# Patient Record
Sex: FEMALE
[De-identification: names, ages, dates, MRNs, and addresses within clinical notes are randomized per-mention and may not be internally consistent; named-entity substitution may affect disease eponyms.]

---

## 2023-10-20 ENCOUNTER — RX ONLY (OUTPATIENT)
Age: 68
Setting detail: RX ONLY
End: 2023-10-20

## 2023-10-20 ENCOUNTER — APPOINTMENT (RX ONLY)
Dept: URBAN - METROPOLITAN AREA CLINIC 88 | Facility: CLINIC | Age: 68
Setting detail: DERMATOLOGY
End: 2023-10-20

## 2023-10-20 DIAGNOSIS — Z01.818 ENCOUNTER FOR OTHER PREPROCEDURAL EXAMINATION: ICD-10-CM

## 2023-10-20 PROCEDURE — ? ADDITIONAL NOTES

## 2023-10-20 RX ORDER — CEPHALEXIN 500 MG/1
CAPSULE ORAL
Qty: 21 | Refills: 2 | Status: ERX | COMMUNITY
Start: 2023-10-20

## 2023-10-20 RX ORDER — OXYCODONE HYDROCHLORIDE AND ACETAMINOPHEN 5; 325 MG/1; MG/1
TABLET ORAL
Qty: 28 | Refills: 0 | Status: ERX | COMMUNITY
Start: 2023-10-20

## 2023-10-20 RX ORDER — ONDANSETRON 4 MG/1
TABLET, ORALLY DISINTEGRATING ORAL
Qty: 15 | Refills: 0 | Status: ERX | COMMUNITY
Start: 2023-10-20

## 2023-10-20 RX ORDER — CYCLOBENZAPRINE HYDROCHLORIDE 5 MG/1
TABLET, FILM COATED ORAL
Qty: 30 | Refills: 0 | Status: ERX | COMMUNITY
Start: 2023-10-20

## 2023-10-20 RX ORDER — APREPITANT 40 MG/1
CAPSULE ORAL
Qty: 1 | Refills: 0 | Status: ERX | COMMUNITY
Start: 2023-10-20

## 2023-10-20 NOTE — PROCEDURE: ADDITIONAL NOTES
Render Risk Assessment In Note?: no
Additional Notes: SX Date: 11/28/23 SX - Explant. \\nPre-op in person 10/20/23\\n\\nHT: 5'2 / WT: 134 lbs / \\nAllergies:NKA\\nPMH: Fibromyalgia, arthritis. \\nHospitalizations: Denies. \\nMeds: Vit C, D3, enzymes, zinc, probiotics, k2, selenium, Mg, activated charcoal.\\nSX HX: breast augmentations, hysterectomy. \\nHx of Nausea after sx: No\\nSmoke/Vape/Hookah: Denies. \\nETOH: Socially. \\n\\nImplants: No.\\nPath: Surgeon's discretion. \\n\\nExparel: Yes. \\nMyers: Yes. \\n\\nBalance:\\n\\nHusband will be taking care of her. \\nMeds sent to pharmacy, Follow up visits scheduled. \\nLabwork CBC w Diff, CMP / EKG /PT/ PTT/ INR CXR / Assessment and clearance. Photos done in office. \\nRecommendations prior to surgery were given. \\nStop blood thinners 2 weeks prior. \\nNicotine test day of sx, Pt verbalized understanding and agreement.

## 2024-02-05 ENCOUNTER — RX ONLY (OUTPATIENT)
Age: 69
Setting detail: RX ONLY
End: 2024-02-05

## 2024-02-05 RX ORDER — ONDANSETRON 4 MG/1
TABLET, ORALLY DISINTEGRATING ORAL
Qty: 15 | Refills: 0 | Status: ERX

## 2024-02-05 RX ORDER — CEPHALEXIN 500 MG/1
CAPSULE ORAL
Qty: 21 | Refills: 2 | Status: ERX

## 2024-02-05 RX ORDER — CYCLOBENZAPRINE HYDROCHLORIDE 5 MG/1
TABLET, FILM COATED ORAL
Qty: 30 | Refills: 0 | Status: ERX

## 2024-02-05 RX ORDER — OXYCODONE HYDROCHLORIDE AND ACETAMINOPHEN 5; 325 MG/1; MG/1
TABLET ORAL
Qty: 28 | Refills: 0 | Status: ERX

## 2024-02-05 RX ORDER — APREPITANT 40 MG/1
CAPSULE ORAL
Qty: 1 | Refills: 0 | Status: ERX

## 2024-02-06 ENCOUNTER — APPOINTMENT (RX ONLY)
Dept: URBAN - METROPOLITAN AREA CLINIC 88 | Facility: CLINIC | Age: 69
Setting detail: DERMATOLOGY
End: 2024-02-06

## 2024-02-06 DIAGNOSIS — Z41.9 ENCOUNTER FOR PROCEDURE FOR PURPOSES OTHER THAN REMEDYING HEALTH STATE, UNSPECIFIED: ICD-10-CM

## 2024-02-06 PROCEDURE — ? OPERATIVE NOTE - BRIEF

## 2024-02-06 NOTE — PROCEDURE: OPERATIVE NOTE - BRIEF
Monitoring: Full continuous cardiac monitoring and automated blood-pressure measurements were performed per protocol.
Detail Level: Detailed
Additional Epidermal Closure: horizontal mattress
Estimated Blood Loss (Cc): minimal
Surgeon: Dr. Eusebio Lua DO FACS
Position: supine

## 2024-02-07 ENCOUNTER — APPOINTMENT (RX ONLY)
Dept: URBAN - METROPOLITAN AREA CLINIC 88 | Facility: CLINIC | Age: 69
Setting detail: DERMATOLOGY
End: 2024-02-07

## 2024-02-07 PROCEDURE — 99024 POSTOP FOLLOW-UP VISIT: CPT

## 2024-02-07 PROCEDURE — ? COUNSELING - POST-OP CHECK, BREAST IMPLANT REMOVAL

## 2024-02-07 NOTE — PROCEDURE: COUNSELING - POST-OP CHECK, BREAST IMPLANT REMOVAL
Detail Level: Simple
Add Postop Global No-Charge Code (62834)?: yes
Count Minor/Major Decisions Toward Mdm (Not Cosmetic)?: No

## 2024-02-13 ENCOUNTER — APPOINTMENT (RX ONLY)
Dept: URBAN - METROPOLITAN AREA CLINIC 88 | Facility: CLINIC | Age: 69
Setting detail: DERMATOLOGY
End: 2024-02-13

## 2024-02-23 ENCOUNTER — APPOINTMENT (RX ONLY)
Dept: URBAN - METROPOLITAN AREA CLINIC 88 | Facility: CLINIC | Age: 69
Setting detail: DERMATOLOGY
End: 2024-02-23

## 2024-02-28 ENCOUNTER — APPOINTMENT (RX ONLY)
Dept: URBAN - METROPOLITAN AREA CLINIC 88 | Facility: CLINIC | Age: 69
Setting detail: DERMATOLOGY
End: 2024-02-28

## 2024-03-28 ENCOUNTER — APPOINTMENT (RX ONLY)
Dept: URBAN - METROPOLITAN AREA CLINIC 88 | Facility: CLINIC | Age: 69
Setting detail: DERMATOLOGY
End: 2024-03-28

## 2024-06-07 ENCOUNTER — APPOINTMENT (RX ONLY)
Dept: URBAN - METROPOLITAN AREA CLINIC 88 | Facility: CLINIC | Age: 69
Setting detail: DERMATOLOGY
End: 2024-06-07

## 2024-09-05 ENCOUNTER — APPOINTMENT (RX ONLY)
Dept: URBAN - METROPOLITAN AREA CLINIC 88 | Facility: CLINIC | Age: 69
Setting detail: DERMATOLOGY
End: 2024-09-05

## 2024-12-10 ENCOUNTER — APPOINTMENT (OUTPATIENT)
Dept: URBAN - METROPOLITAN AREA CLINIC 88 | Facility: CLINIC | Age: 69
Setting detail: DERMATOLOGY
End: 2024-12-10

## 2025-02-10 ENCOUNTER — APPOINTMENT (OUTPATIENT)
Dept: URBAN - METROPOLITAN AREA CLINIC 88 | Facility: CLINIC | Age: 70
Setting detail: DERMATOLOGY
End: 2025-02-10

## 2025-04-09 ENCOUNTER — APPOINTMENT (OUTPATIENT)
Dept: URBAN - METROPOLITAN AREA CLINIC 88 | Facility: CLINIC | Age: 70
Setting detail: DERMATOLOGY
End: 2025-04-09

## 2025-04-23 ENCOUNTER — RX ONLY (RX ONLY)
Age: 70
End: 2025-04-23

## 2025-04-23 RX ORDER — CEPHALEXIN 500 MG/1
CAPSULE ORAL
Qty: 21 | Refills: 2 | Status: ERX

## 2025-04-23 RX ORDER — CYCLOBENZAPRINE HYDROCHLORIDE 5 MG/1
TABLET, FILM COATED ORAL
Qty: 30 | Refills: 0 | Status: ERX

## 2025-04-23 RX ORDER — APREPITANT 40 MG/1
CAPSULE ORAL
Qty: 1 | Refills: 0 | Status: CANCELLED

## 2025-04-23 RX ORDER — ONDANSETRON 4 MG/1
TABLET, ORALLY DISINTEGRATING ORAL
Qty: 15 | Refills: 0 | Status: ERX

## 2025-04-23 RX ORDER — OXYCODONE HYDROCHLORIDE AND ACETAMINOPHEN 5; 325 MG/1; MG/1
TABLET ORAL
Qty: 28 | Refills: 0 | Status: ERX

## 2025-04-24 ENCOUNTER — RX ONLY (RX ONLY)
Age: 70
End: 2025-04-24

## 2025-04-24 RX ORDER — APREPITANT 40 MG/1
CAPSULE ORAL
Qty: 1 | Refills: 0 | Status: ERX

## 2025-04-30 ENCOUNTER — APPOINTMENT (OUTPATIENT)
Dept: URBAN - METROPOLITAN AREA CLINIC 88 | Facility: CLINIC | Age: 70
Setting detail: DERMATOLOGY
End: 2025-04-30

## 2025-04-30 PROCEDURE — ? OPERATIVE NOTE - BRIEF

## 2025-04-30 NOTE — PROCEDURE: OPERATIVE NOTE - BRIEF
Epidermal Closure: horizontal mattress
Detail Level: Detailed
Surgeon: Dr. Eusebio Lua DO FACS
Estimated Blood Loss (Cc): minimal
Monitoring: Full continuous cardiac monitoring and automated blood-pressure measurements were performed per protocol.
Position: supine

## 2025-05-01 ENCOUNTER — APPOINTMENT (OUTPATIENT)
Dept: URBAN - METROPOLITAN AREA CLINIC 88 | Facility: CLINIC | Age: 70
Setting detail: DERMATOLOGY
End: 2025-05-01

## 2025-05-07 ENCOUNTER — APPOINTMENT (OUTPATIENT)
Dept: URBAN - METROPOLITAN AREA CLINIC 88 | Facility: CLINIC | Age: 70
Setting detail: DERMATOLOGY
End: 2025-05-07

## 2025-05-07 ENCOUNTER — RX ONLY (RX ONLY)
Age: 70
End: 2025-05-07

## 2025-05-07 RX ORDER — METHYLPREDNISOLONE 4 MG/1
TABLET ORAL
Qty: 1 | Refills: 0 | Status: ERX | COMMUNITY
Start: 2025-05-07

## 2025-06-20 ENCOUNTER — APPOINTMENT (OUTPATIENT)
Dept: URBAN - METROPOLITAN AREA CLINIC 88 | Facility: CLINIC | Age: 70
Setting detail: DERMATOLOGY
End: 2025-06-20